# Patient Record
Sex: FEMALE | Race: WHITE | URBAN - METROPOLITAN AREA
[De-identification: names, ages, dates, MRNs, and addresses within clinical notes are randomized per-mention and may not be internally consistent; named-entity substitution may affect disease eponyms.]

---

## 2024-07-16 ENCOUNTER — APPOINTMENT (RX ONLY)
Dept: URBAN - METROPOLITAN AREA CLINIC 281 | Facility: CLINIC | Age: 69
Setting detail: DERMATOLOGY
End: 2024-07-16

## 2024-07-16 DIAGNOSIS — L72.0 EPIDERMAL CYST: ICD-10-CM

## 2024-07-16 DIAGNOSIS — L73.8 OTHER SPECIFIED FOLLICULAR DISORDERS: ICD-10-CM

## 2024-07-16 DIAGNOSIS — D22 MELANOCYTIC NEVI: ICD-10-CM

## 2024-07-16 DIAGNOSIS — L82.1 OTHER SEBORRHEIC KERATOSIS: ICD-10-CM

## 2024-07-16 DIAGNOSIS — I83.9 ASYMPTOMATIC VARICOSE VEINS OF LOWER EXTREMITIES: ICD-10-CM

## 2024-07-16 DIAGNOSIS — D18.0 HEMANGIOMA: ICD-10-CM

## 2024-07-16 DIAGNOSIS — L82.0 INFLAMED SEBORRHEIC KERATOSIS: ICD-10-CM

## 2024-07-16 DIAGNOSIS — L81.4 OTHER MELANIN HYPERPIGMENTATION: ICD-10-CM

## 2024-07-16 PROBLEM — D22.5 MELANOCYTIC NEVI OF TRUNK: Status: ACTIVE | Noted: 2024-07-16

## 2024-07-16 PROBLEM — D18.01 HEMANGIOMA OF SKIN AND SUBCUTANEOUS TISSUE: Status: ACTIVE | Noted: 2024-07-16

## 2024-07-16 PROBLEM — I83.93 ASYMPTOMATIC VARICOSE VEINS OF BILATERAL LOWER EXTREMITIES: Status: ACTIVE | Noted: 2024-07-16

## 2024-07-16 PROCEDURE — ? COUNSELING

## 2024-07-16 PROCEDURE — 99203 OFFICE O/P NEW LOW 30 MIN: CPT

## 2024-07-16 PROCEDURE — ? FULL BODY SKIN EXAM

## 2024-07-16 PROCEDURE — ? SUNSCREEN RECOMMENDATIONS

## 2024-07-16 PROCEDURE — ? PRESCRIPTION

## 2024-07-16 RX ORDER — TRIAMCINOLONE ACETONIDE 1 MG/G
OINTMENT TOPICAL
Qty: 30 | Refills: 0 | Status: ERX | COMMUNITY
Start: 2024-07-16

## 2024-07-16 RX ADMIN — TRIAMCINOLONE ACETONIDE: 1 OINTMENT TOPICAL at 00:00

## 2024-07-16 ASSESSMENT — LOCATION SIMPLE DESCRIPTION DERM
LOCATION SIMPLE: RIGHT PRETIBIAL REGION
LOCATION SIMPLE: CHEST
LOCATION SIMPLE: UPPER BACK
LOCATION SIMPLE: LEFT CHEEK
LOCATION SIMPLE: LEFT PRETIBIAL REGION
LOCATION SIMPLE: LEFT FOREARM
LOCATION SIMPLE: RIGHT TEMPLE
LOCATION SIMPLE: RIGHT FOREARM

## 2024-07-16 ASSESSMENT — LOCATION ZONE DERM
LOCATION ZONE: TRUNK
LOCATION ZONE: ARM
LOCATION ZONE: LEG
LOCATION ZONE: FACE

## 2024-07-16 ASSESSMENT — LOCATION DETAILED DESCRIPTION DERM
LOCATION DETAILED: UPPER STERNUM
LOCATION DETAILED: LEFT PROXIMAL DORSAL FOREARM
LOCATION DETAILED: RIGHT DISTAL PRETIBIAL REGION
LOCATION DETAILED: RIGHT VENTRAL PROXIMAL FOREARM
LOCATION DETAILED: RIGHT PROXIMAL DORSAL FOREARM
LOCATION DETAILED: LEFT DISTAL DORSAL FOREARM
LOCATION DETAILED: LEFT VENTRAL PROXIMAL FOREARM
LOCATION DETAILED: RIGHT MID TEMPLE
LOCATION DETAILED: INFERIOR THORACIC SPINE
LOCATION DETAILED: LEFT CENTRAL MALAR CHEEK
LOCATION DETAILED: LEFT DISTAL PRETIBIAL REGION

## 2024-07-16 NOTE — HPI: EVALUATION OF SKIN LESION(S)
Hpi Title: Evaluation of Skin Lesions
Have Your Spot(S) Been Treated In The Past?: has not been treated
Additional History: Has irritated lesions on the face and legs.

## 2025-07-16 ENCOUNTER — APPOINTMENT (OUTPATIENT)
Dept: URBAN - METROPOLITAN AREA CLINIC 281 | Facility: CLINIC | Age: 70
Setting detail: DERMATOLOGY
End: 2025-07-16

## 2025-07-16 VITALS — HEIGHT: 63 IN | WEIGHT: 250 LBS

## 2025-07-16 DIAGNOSIS — L44.8 OTHER SPECIFIED PAPULOSQUAMOUS DISORDERS: ICD-10-CM

## 2025-07-16 DIAGNOSIS — L82.1 OTHER SEBORRHEIC KERATOSIS: ICD-10-CM

## 2025-07-16 DIAGNOSIS — D22 MELANOCYTIC NEVI: ICD-10-CM

## 2025-07-16 PROBLEM — D22.0 MELANOCYTIC NEVI OF LIP: Status: ACTIVE | Noted: 2025-07-16

## 2025-07-16 PROCEDURE — ? COUNSELING

## 2025-07-16 ASSESSMENT — LOCATION ZONE DERM
LOCATION ZONE: LEG
LOCATION ZONE: FACE
LOCATION ZONE: LEG
LOCATION ZONE: LIP
LOCATION ZONE: FACE
LOCATION ZONE: LIP

## 2025-07-16 ASSESSMENT — LOCATION DETAILED DESCRIPTION DERM
LOCATION DETAILED: LEFT MEDIAL MALAR CHEEK
LOCATION DETAILED: RIGHT PROXIMAL LATERAL CALF
LOCATION DETAILED: RIGHT PROXIMAL LATERAL CALF
LOCATION DETAILED: LEFT CENTRAL MALAR CHEEK
LOCATION DETAILED: RIGHT INFERIOR VERMILION LIP
LOCATION DETAILED: RIGHT INFERIOR VERMILION LIP

## 2025-07-16 ASSESSMENT — LOCATION SIMPLE DESCRIPTION DERM
LOCATION SIMPLE: LEFT CHEEK
LOCATION SIMPLE: RIGHT LIP
LOCATION SIMPLE: RIGHT CALF
LOCATION SIMPLE: LEFT CHEEK
LOCATION SIMPLE: RIGHT LIP
LOCATION SIMPLE: RIGHT CALF